# Patient Record
Sex: FEMALE | Race: WHITE | NOT HISPANIC OR LATINO | Employment: STUDENT | URBAN - METROPOLITAN AREA
[De-identification: names, ages, dates, MRNs, and addresses within clinical notes are randomized per-mention and may not be internally consistent; named-entity substitution may affect disease eponyms.]

---

## 2017-05-28 ENCOUNTER — ALLSCRIPTS OFFICE VISIT (OUTPATIENT)
Dept: OTHER | Facility: OTHER | Age: 22
End: 2017-05-28

## 2018-01-07 ENCOUNTER — GENERIC CONVERSION - ENCOUNTER (OUTPATIENT)
Dept: OTHER | Facility: OTHER | Age: 23
End: 2018-01-07

## 2018-01-14 VITALS
RESPIRATION RATE: 24 BRPM | TEMPERATURE: 97.8 F | HEIGHT: 70 IN | DIASTOLIC BLOOD PRESSURE: 64 MMHG | WEIGHT: 270 LBS | SYSTOLIC BLOOD PRESSURE: 132 MMHG | HEART RATE: 63 BPM | BODY MASS INDEX: 38.65 KG/M2 | OXYGEN SATURATION: 97 %

## 2018-01-24 VITALS
BODY MASS INDEX: 37.83 KG/M2 | RESPIRATION RATE: 18 BRPM | SYSTOLIC BLOOD PRESSURE: 120 MMHG | OXYGEN SATURATION: 97 % | TEMPERATURE: 99.2 F | HEART RATE: 83 BPM | HEIGHT: 70 IN | WEIGHT: 264.25 LBS | DIASTOLIC BLOOD PRESSURE: 78 MMHG

## 2018-08-13 ENCOUNTER — OFFICE VISIT (OUTPATIENT)
Dept: URGENT CARE | Facility: CLINIC | Age: 23
End: 2018-08-13
Payer: COMMERCIAL

## 2018-08-13 ENCOUNTER — APPOINTMENT (OUTPATIENT)
Dept: RADIOLOGY | Facility: CLINIC | Age: 23
End: 2018-08-13
Payer: COMMERCIAL

## 2018-08-13 VITALS
DIASTOLIC BLOOD PRESSURE: 70 MMHG | HEART RATE: 83 BPM | TEMPERATURE: 98.1 F | BODY MASS INDEX: 39.43 KG/M2 | RESPIRATION RATE: 16 BRPM | HEIGHT: 69 IN | SYSTOLIC BLOOD PRESSURE: 120 MMHG | OXYGEN SATURATION: 100 % | WEIGHT: 266.2 LBS

## 2018-08-13 DIAGNOSIS — J20.9 ACUTE BRONCHITIS, UNSPECIFIED ORGANISM: Primary | ICD-10-CM

## 2018-08-13 DIAGNOSIS — J06.9 ACUTE URI: ICD-10-CM

## 2018-08-13 DIAGNOSIS — J20.9 ACUTE BRONCHITIS, UNSPECIFIED ORGANISM: ICD-10-CM

## 2018-08-13 PROCEDURE — 71046 X-RAY EXAM CHEST 2 VIEWS: CPT

## 2018-08-13 PROCEDURE — 99214 OFFICE O/P EST MOD 30 MIN: CPT | Performed by: PHYSICIAN ASSISTANT

## 2018-08-13 RX ORDER — GLIMEPIRIDE 4 MG/1
TABLET ORAL
COMMUNITY
End: 2019-11-24 | Stop reason: ALTCHOICE

## 2018-08-13 RX ORDER — AZITHROMYCIN 250 MG/1
TABLET, FILM COATED ORAL
Qty: 6 TABLET | Refills: 0 | Status: SHIPPED | OUTPATIENT
Start: 2018-08-13 | End: 2018-08-17

## 2018-08-13 RX ORDER — ALBUTEROL SULFATE 90 UG/1
AEROSOL, METERED RESPIRATORY (INHALATION)
COMMUNITY
End: 2019-11-24 | Stop reason: ALTCHOICE

## 2018-08-13 NOTE — PROGRESS NOTES
[facility]        NAME: Jayson Galeazzi is a 21 y o  female  : 1995    MRN: [id number]  DATE: 2018  TIME: 1:42 PM    Assessment and Plan   Acute bronchitis, unspecified organism [J20 9]  1  Acute bronchitis, unspecified organism  XR chest pa & lateral   2  Acute URI  azithromycin (ZITHROMAX) 250 mg tablet     Patient Instructions   Take antibiotic as directed with food and water  While on this medication begin a decongestant such as Mucinex and a nasal steroid such as Flonase  Use a cool mist humidifier at bedtime, turning on hours prior to bed with bedroom doors shut for maximum relief  You may take tylenol or motrin as needed for fever and discomfort  Follow up with your family doctor in 3-5 days  Proceed to the ER if symptoms worsen  Advised regular use of albuterol inhaler, 1-2 puffs every 4-6 hours prn chest congestion  Reviewed treatment plan in length  All questions answered  Precautions given  Chief Complaint     Chief Complaint   Patient presents with    Cough     x two weeks barking cough, chest tightness using an inhaler 1 x per day     History of Present Illness       20 y/o female presenting with c/o of dry, barking cough x ~2 5 weeks  Pt notes sensation of chest congestion but states she has to repeatedly forcefully cough and force herself to bring up phlegm  She does not SOB both at rest and with exertion, occurring ~1-2 times per day, occasionally associated with reported inspiratory wheezing, with relief of these symptoms with albuterol inhaler  Sx associated with nasal congestion, maxillary sinus pressure, b/l ear clogging, and PND  No GI sx  No treatments tried  She notes she gets recurrent infections but has not been evaluated by PCP or other physician for immunocompromise  Review of Systems   Review of Systems   Constitutional: Negative for chills, diaphoresis, fatigue and fever  HENT: Negative for sore throat      Respiratory: Negative for chest tightness  Cardiovascular: Negative for chest pain and palpitations  Gastrointestinal: Negative for abdominal pain, diarrhea, nausea and vomiting  Musculoskeletal: Negative for arthralgias, joint swelling and myalgias  Skin: Negative for color change and rash  Neurological: Negative for dizziness, weakness, light-headedness and headaches  Current Medications       Current Outpatient Prescriptions:     albuterol (PROVENTIL HFA,VENTOLIN HFA) 90 mcg/act inhaler, Inhale, Disp: , Rfl:     glimepiride (AMARYL) 4 mg tablet, Take by mouth, Disp: , Rfl:     azithromycin (ZITHROMAX) 250 mg tablet, Take two tablets on day one, then one tablet daily  , Disp: 6 tablet, Rfl: 0    Current Allergies     Allergies as of 08/13/2018    (No Known Allergies)            The following portions of the patient's history were reviewed and updated as appropriate: allergies, current medications, past family history, past medical history, past social history, past surgical history and problem list      History reviewed  No pertinent past medical history  Past Surgical History:   Procedure Laterality Date    FOOT SURGERY      HIP ARTHROSCOPY         Family History   Problem Relation Age of Onset    No Known Problems Mother     Diabetes Father     Cancer Father          Medications have been verified  Objective   /70   Pulse 83   Temp 98 1 °F (36 7 °C)   Resp 16   Ht 5' 9" (1 753 m)   Wt 121 kg (266 lb 3 2 oz)   LMP 07/13/2018   SpO2 100%   BMI 39 31 kg/m²      CXR: no acute cardiopulmonary disease  Physical Exam     Physical Exam   Constitutional: She is oriented to person, place, and time  She appears well-developed and well-nourished  She appears ill  No distress  HENT:   Head: Normocephalic and atraumatic  Right Ear: Tympanic membrane, external ear and ear canal normal    Left Ear: Tympanic membrane, external ear and ear canal normal    Nose: Mucosal edema and rhinorrhea (clear) present  Mouth/Throat: Uvula is midline, oropharynx is clear and moist and mucous membranes are normal  Mucous membranes are not pale and not dry  No oropharyngeal exudate, posterior oropharyngeal edema or posterior oropharyngeal erythema  Eyes: Conjunctivae are normal    Neck: Neck supple  Cardiovascular: Normal rate, regular rhythm and normal heart sounds  Pulmonary/Chest: Effort normal  No respiratory distress  She has no decreased breath sounds  Coarse breath sounds throughout all lung fields  Breath sounds decreased in RUL and RML  No wheezes, rales, or rhonchi  Lymphadenopathy:     She has cervical adenopathy  Neurological: She is alert and oriented to person, place, and time  No cranial nerve deficit  She exhibits normal muscle tone  Coordination normal    Skin: Skin is warm and dry  No rash noted  She is not diaphoretic  Psychiatric: She has a normal mood and affect  Her behavior is normal    Nursing note and vitals reviewed

## 2018-08-13 NOTE — PATIENT INSTRUCTIONS
Take antibiotic as directed with food and water  While on this medication begin a decongestant such as Mucinex and a nasal steroid such as Flonase  Use a cool mist humidifier at bedtime, turning on hours prior to bed with bedroom doors shut for maximum relief  You may take tylenol or motrin as needed for fever and discomfort  Follow up with your family doctor in 3-5 days  Proceed to the ER if symptoms worsen

## 2019-07-09 ENCOUNTER — OFFICE VISIT (OUTPATIENT)
Dept: URGENT CARE | Facility: CLINIC | Age: 24
End: 2019-07-09
Payer: COMMERCIAL

## 2019-07-09 VITALS
OXYGEN SATURATION: 99 % | DIASTOLIC BLOOD PRESSURE: 80 MMHG | SYSTOLIC BLOOD PRESSURE: 118 MMHG | BODY MASS INDEX: 38.54 KG/M2 | HEART RATE: 76 BPM | TEMPERATURE: 98.8 F | WEIGHT: 261 LBS | RESPIRATION RATE: 16 BRPM

## 2019-07-09 DIAGNOSIS — J06.9 ACUTE URI: Primary | ICD-10-CM

## 2019-07-09 PROCEDURE — 99213 OFFICE O/P EST LOW 20 MIN: CPT | Performed by: PHYSICIAN ASSISTANT

## 2019-07-09 RX ORDER — FLUTICASONE PROPIONATE 50 MCG
1 SPRAY, SUSPENSION (ML) NASAL DAILY
Qty: 1 BOTTLE | Refills: 0 | Status: SHIPPED | OUTPATIENT
Start: 2019-07-09 | End: 2019-11-24 | Stop reason: ALTCHOICE

## 2019-07-09 RX ORDER — BENZONATATE 100 MG/1
200 CAPSULE ORAL 3 TIMES DAILY PRN
Qty: 30 CAPSULE | Refills: 0 | Status: SHIPPED | OUTPATIENT
Start: 2019-07-09 | End: 2019-11-24 | Stop reason: ALTCHOICE

## 2019-07-09 NOTE — PATIENT INSTRUCTIONS

## 2019-07-09 NOTE — PROGRESS NOTES
[facility]        NAME: Barbara Aguayo is a 21 y o  female  : 1995    MRN: [id number]  DATE: 2019  TIME: 10:02 AM    Assessment and Plan   Acute URI [J06 9]  1  Acute URI  benzonatate (TESSALON PERLES) 100 mg capsule    fluticasone (FLONASE) 50 mcg/act nasal spray         Patient Instructions     Acute Upper Respiratory Tract Infection:   -There is no sign of bacterial infection on exam at this time  This is likely a viral illness  Advised supportive measures   -Fluticasone Nasal Spray for PND and congestion-PND may be contributing to the cough  Tessalon perles for the cough  -You can use OTC zyrtec or claritin  You can OTC mucinex  -Use a humidifier next to your bed  Take steam showers  -You can take Advil or Tylenol for fever or pain  -Stay very well hydrated and rest   -If your symptoms persist or worsen follow up immediately with your PCP    Follow up with PCP in 3-5 days  Proceed to  ER if symptoms worsen  Chief Complaint     Chief Complaint   Patient presents with    Cold Like Symptoms      started Friday: hoarse voice, pain with swallowing, ear pain bilateral, body aches; fatigued         History of Present Illness       The patient presents today for a four day hx of hoarseness, pharyngitis and bilateral otalgia with myalgias and fatigue  The patient states that she is also experiencing a dry cough that began two days ago  She denies fever, chills, dyspnea, wheezing, cp, palpitations, dizziness, headache, hearing loss, tinnitus, otorrhea, neck pain, drooling  She denies sick contacts or recent travel  She denies a hx of asthma or smoking  No OTC measures have been attempted  Review of Systems   Review of Systems   Constitutional: Positive for fatigue  Negative for activity change, appetite change, chills, diaphoresis and fever  HENT: Positive for ear pain, sore throat, trouble swallowing and voice change   Negative for congestion, ear discharge, facial swelling, hearing loss, postnasal drip, rhinorrhea, sinus pressure, sinus pain, sneezing and tinnitus  Respiratory: Positive for cough  Negative for chest tightness, shortness of breath, wheezing and stridor  Cardiovascular: Negative for chest pain, palpitations and leg swelling  Gastrointestinal: Negative for abdominal pain, diarrhea, nausea and vomiting  Musculoskeletal: Positive for myalgias  Negative for arthralgias, joint swelling, neck pain and neck stiffness  Skin: Negative for rash  Allergic/Immunologic: Negative for immunocompromised state  Neurological: Negative for dizziness, weakness, light-headedness, numbness and headaches  Hematological: Negative for adenopathy  Current Medications       Current Outpatient Medications:     albuterol (PROVENTIL HFA,VENTOLIN HFA) 90 mcg/act inhaler, Inhale, Disp: , Rfl:     benzonatate (TESSALON PERLES) 100 mg capsule, Take 2 capsules (200 mg total) by mouth 3 (three) times a day as needed for cough, Disp: 30 capsule, Rfl: 0    fluticasone (FLONASE) 50 mcg/act nasal spray, 1 spray into each nostril daily, Disp: 1 Bottle, Rfl: 0    glimepiride (AMARYL) 4 mg tablet, Take by mouth, Disp: , Rfl:     Current Allergies     Allergies as of 07/09/2019    (No Known Allergies)            The following portions of the patient's history were reviewed and updated as appropriate: allergies, current medications, past family history, past medical history, past social history, past surgical history and problem list      Past Medical History:   Diagnosis Date    Patient denies medical problems        Past Surgical History:   Procedure Laterality Date    FOOT SURGERY      HIP ARTHROSCOPY      TONSILLECTOMY         Family History   Problem Relation Age of Onset    No Known Problems Mother     Diabetes Father     Cancer Father          Medications have been verified          Objective   /80   Pulse 76   Temp 98 8 °F (37 1 °C)   Resp 16   Wt 118 kg (261 lb)   LMP 06/23/2019   SpO2 99%   BMI 38 54 kg/m²        Physical Exam     Physical Exam   Constitutional: She is oriented to person, place, and time  She appears well-developed and well-nourished  No distress  HENT:   Head: Normocephalic and atraumatic  Right Ear: Hearing, external ear and ear canal normal  A middle ear effusion is present  Left Ear: Hearing, external ear and ear canal normal  A middle ear effusion is present  Nose: Mucosal edema and rhinorrhea present  Right sinus exhibits no maxillary sinus tenderness and no frontal sinus tenderness  Left sinus exhibits no maxillary sinus tenderness and no frontal sinus tenderness  Mouth/Throat: Uvula is midline, oropharynx is clear and moist and mucous membranes are normal  No uvula swelling  No oropharyngeal exudate, posterior oropharyngeal edema, posterior oropharyngeal erythema or tonsillar abscesses  Tonsils are 0 on the right  Tonsils are 0 on the left  Neck: Normal range of motion  Neck supple  Cardiovascular: Normal rate, regular rhythm, S1 normal and S2 normal  Exam reveals no gallop, no S3, no S4, no distant heart sounds and no friction rub  No murmur heard  Pulmonary/Chest: No accessory muscle usage or stridor  No tachypnea and no bradypnea  No respiratory distress  She has no decreased breath sounds  She has no wheezes  She has no rhonchi  She has no rales  Course breath sounds/transmitted upper airway sounds of the upper lung bilaterally    Lymphadenopathy:     She has no cervical adenopathy  Neurological: She is alert and oriented to person, place, and time  Skin: She is not diaphoretic  Psychiatric: She has a normal mood and affect  Her behavior is normal    Nursing note and vitals reviewed

## 2019-11-24 ENCOUNTER — OFFICE VISIT (OUTPATIENT)
Dept: URGENT CARE | Facility: CLINIC | Age: 24
End: 2019-11-24
Payer: COMMERCIAL

## 2019-11-24 VITALS
SYSTOLIC BLOOD PRESSURE: 126 MMHG | OXYGEN SATURATION: 99 % | TEMPERATURE: 98.2 F | HEART RATE: 80 BPM | WEIGHT: 260 LBS | DIASTOLIC BLOOD PRESSURE: 74 MMHG | BODY MASS INDEX: 38.4 KG/M2 | RESPIRATION RATE: 14 BRPM

## 2019-11-24 DIAGNOSIS — J20.9 ACUTE BRONCHITIS, UNSPECIFIED ORGANISM: ICD-10-CM

## 2019-11-24 DIAGNOSIS — J01.00 ACUTE NON-RECURRENT MAXILLARY SINUSITIS: Primary | ICD-10-CM

## 2019-11-24 PROCEDURE — 99213 OFFICE O/P EST LOW 20 MIN: CPT | Performed by: NURSE PRACTITIONER

## 2019-11-24 RX ORDER — AMOXICILLIN AND CLAVULANATE POTASSIUM 875; 125 MG/1; MG/1
1 TABLET, FILM COATED ORAL EVERY 12 HOURS SCHEDULED
Qty: 14 TABLET | Refills: 0 | Status: SHIPPED | OUTPATIENT
Start: 2019-11-24 | End: 2019-12-01

## 2019-11-24 RX ORDER — PREDNISONE 20 MG/1
20 TABLET ORAL DAILY
Qty: 5 TABLET | Refills: 0 | Status: SHIPPED | OUTPATIENT
Start: 2019-11-24 | End: 2019-11-29

## 2019-11-24 RX ORDER — DEXTROMETHORPHAN HYDROBROMIDE AND PROMETHAZINE HYDROCHLORIDE 15; 6.25 MG/5ML; MG/5ML
5 SOLUTION ORAL 4 TIMES DAILY PRN
Qty: 180 ML | Refills: 0 | Status: SHIPPED | OUTPATIENT
Start: 2019-11-24 | End: 2020-02-28 | Stop reason: ALTCHOICE

## 2019-11-24 RX ORDER — BECLOMETHASONE DIPROPIONATE HFA 80 UG/1
AEROSOL, METERED RESPIRATORY (INHALATION)
COMMUNITY
Start: 2019-11-22

## 2019-11-24 NOTE — PROGRESS NOTES
[facility]        NAME: Marnie Montague is a 25 y o  female  : 1995    MRN: [id number]  DATE: 2019  TIME: 9:17 AM    Assessment and Plan   1  Acute non-recurrent maxillary sinusitis  - amoxicillin-clavulanate (AUGMENTIN) 875-125 mg per tablet; Take 1 tablet by mouth every 12 (twelve) hours for 7 days  Dispense: 14 tablet; Refill: 0  2  Acute bronchitis, unspecified organism  - Promethazine-DM (PHENERGAN-DM) 6 25-15 mg/5 mL oral syrup; Take 5 mL by mouth 4 (four) times a day as needed for cough  Dispense: 180 mL; Refill: 0  - predniSONE 20 mg tablet; Take 1 tablet (20 mg total) by mouth daily for 5 days  Dispense: 5 tablet; Refill: 0      Patient Instructions     Fluids  Rest  Nasal saline  Symptom management for supportive care such as decongestants, tylenol/motrin as needed for fever or discomfort  Use a cool mist humidifier at bedtime  Finish antibiotics as prescribed  Augmentin 875mg twice daily for 7 days  Prednisone 20mg daily with food for 5 days  Promethazine DM as needed for cough  Warm compresses to facilitate nasal drainage  Rescue inhaler 2 puffs every 4-6 hours as needed for shortness breath, chest tightness, bronchospasm, coughing fits  Follow up with PCP in 3-5 days        Chief Complaint     Chief Complaint   Patient presents with   Delma Sang Like Symptoms     pt presents with productive cough, body aches, ear pain bilateral, sinus pressure, PND; started 2 weeks ago         History of Present Illness       Here for cold symptoms for 2 weeks  Nasal congestion, headaches, cough, chest tightness  Sore throat  No fevers  Took sudafed and other decongestants and delsym  Has asthma, more seasonal secondary to allergies  Uses Qvar daily  Has not had to use rescue inhaler  Review of Systems   Review of Systems   Constitutional: Positive for fatigue  Negative for fever     HENT: Positive for congestion, postnasal drip, rhinorrhea, sinus pressure, sinus pain and sore throat  Respiratory: Positive for cough, shortness of breath and wheezing  Gastrointestinal: Negative for diarrhea, nausea and vomiting  Musculoskeletal: Positive for myalgias  Skin: Negative for rash  Neurological: Positive for headaches  Negative for dizziness  Hematological: Negative for adenopathy  Current Medications       Current Outpatient Medications:     QVAR REDIHALER 80 MCG/ACT inhaler, , Disp: , Rfl:     Current Allergies     Allergies as of 11/24/2019    (No Known Allergies)            The following portions of the patient's history were reviewed and updated as appropriate: allergies, current medications, past family history, past medical history, past social history, past surgical history and problem list      Past Medical History:   Diagnosis Date    Allergic     seasonal    Patient denies medical problems        Past Surgical History:   Procedure Laterality Date    FOOT SURGERY      HIP ARTHROSCOPY      TONSILLECTOMY         Family History   Problem Relation Age of Onset    No Known Problems Mother     Diabetes Father     Cancer Father          Medications have been verified  Objective   /74   Pulse 80   Temp 98 2 °F (36 8 °C)   Resp 14   Wt 118 kg (260 lb)   LMP 11/03/2019   SpO2 99%   BMI 38 40 kg/m²        Physical Exam     Physical Exam   Constitutional: She appears well-developed and well-nourished  HENT:   TMS WNL  Turbinates significantly inflamed  Oropharynx with no erythema or exudate    (+) maxillary sinus tenderness to palpation    (+) PND     Pulmonary/Chest: Effort normal    Slightly decreased breath sounds  Faint scattered wheeze   Lymphadenopathy:     She has no cervical adenopathy  Neurological: She is alert  Skin: Skin is warm and dry  Vitals reviewed

## 2019-11-24 NOTE — PATIENT INSTRUCTIONS
Fluids  Rest  Nasal saline  Symptom management for supportive care such as decongestants, tylenol/motrin as needed for fever or discomfort  Use a cool mist humidifier at bedtime  Finish antibiotics as prescribed  Augmentin 875mg twice daily for 7 days  Prednisone 20mg daily with food for 5 days  Promethazine DM as needed for cough  Warm compresses to facilitate nasal drainage  Rescue inhaler 2 puffs every 4-6 hours as needed for shortness breath, chest tightness, bronchospasm, coughing fits     Follow up with PCP in 3-5 days

## 2020-02-28 ENCOUNTER — OFFICE VISIT (OUTPATIENT)
Dept: URGENT CARE | Facility: CLINIC | Age: 25
End: 2020-02-28
Payer: COMMERCIAL

## 2020-02-28 VITALS
BODY MASS INDEX: 38.4 KG/M2 | DIASTOLIC BLOOD PRESSURE: 84 MMHG | SYSTOLIC BLOOD PRESSURE: 128 MMHG | TEMPERATURE: 99.1 F | HEART RATE: 86 BPM | RESPIRATION RATE: 16 BRPM | OXYGEN SATURATION: 99 % | WEIGHT: 260 LBS

## 2020-02-28 DIAGNOSIS — J20.8 ACUTE VIRAL BRONCHITIS: Primary | ICD-10-CM

## 2020-02-28 PROBLEM — E11.9 TYPE 2 DIABETES MELLITUS (HCC): Status: ACTIVE | Noted: 2017-05-28

## 2020-02-28 PROBLEM — J45.909 ASTHMA: Status: ACTIVE | Noted: 2017-05-28

## 2020-02-28 PROCEDURE — 99213 OFFICE O/P EST LOW 20 MIN: CPT | Performed by: FAMILY MEDICINE

## 2020-02-28 RX ORDER — PREDNISONE 50 MG/1
50 TABLET ORAL DAILY
Qty: 5 TABLET | Refills: 0 | Status: SHIPPED | OUTPATIENT
Start: 2020-02-28 | End: 2020-03-04

## 2020-02-28 RX ORDER — ALBUTEROL SULFATE 90 UG/1
2 AEROSOL, METERED RESPIRATORY (INHALATION) EVERY 6 HOURS PRN
Qty: 18 G | Refills: 0 | Status: SHIPPED | OUTPATIENT
Start: 2020-02-28

## 2020-02-28 RX ORDER — BENZONATATE 200 MG/1
200 CAPSULE ORAL 3 TIMES DAILY PRN
Qty: 20 CAPSULE | Refills: 0 | Status: SHIPPED | OUTPATIENT
Start: 2020-02-28

## 2020-02-28 NOTE — PATIENT INSTRUCTIONS
1  Acute Viral Bronchitis  - Prednisone x 5 days prescribed, complete as directed-- patient is diabetic, aware to stay compliant w/ treatment plan and monitor blood sugar levels  - Tessalon pearls prescribed to help w/ cough, use as directed  - Albuterol inhaler prescribed to help w/ wheezing, use as directed  - rest and drink plenty of fluids  - take Tylenol or Motrin as needed  - run a humidifier at home   - try warm salt water gargles and throat lozenges as needed  - try warm tea w/ lemon and honey   - follow up w/ PCP for re-check in 3-5 days   - if symptoms persist despite treatment, worsen, or any new symptoms present, should be seen in the ER

## 2020-02-28 NOTE — PROGRESS NOTES
[facility]        NAME: Edy Rodgers is a 25 y o  female  : 1995    MRN: [id number]  DATE: 2020  TIME: 4:58 PM    Assessment and Plan   Acute viral bronchitis [J20 8]  1  Acute viral bronchitis  benzonatate (TESSALON) 200 MG capsule    albuterol (Ventolin HFA) 90 mcg/act inhaler    predniSONE 50 mg tablet         Patient Instructions     Patient Instructions   1  Acute Viral Bronchitis  - Prednisone x 5 days prescribed, complete as directed-- patient is diabetic, aware to stay compliant w/ treatment plan and monitor blood sugar levels  - Tessalon pearls prescribed to help w/ cough, use as directed  - Albuterol inhaler prescribed to help w/ wheezing, use as directed  - rest and drink plenty of fluids  - take Tylenol or Motrin as needed  - run a humidifier at home   - try warm salt water gargles and throat lozenges as needed  - try warm tea w/ lemon and honey   - follow up w/ PCP for re-check in 3-5 days   - if symptoms persist despite treatment, worsen, or any new symptoms present, should be seen in the ER  Follow up with PCP in 3-5 days  Proceed to  ER if symptoms worsen  Chief Complaint     Chief Complaint   Patient presents with   Walker Pierre Like Symptoms     recently returned from trip to [place]         History of Present Illness       26 yo female presents c/o nasal congestion, rhinorrhea, sore throat, chest congestion, and productive cough x 4 days  No fever/chills  No headache or body aches  No chest pain or SOB, but she has noted some wheezing  She has a diagnosis of asthma for which she is on Qvar daily and uses as prescribed  She is not a smoker  No GI sx  No skin rashes  She did not get the flu shot this year  She recently returned from a trip to [place], no international travel  No known sick contacts  She has been taking Mucinex and Phenergan cough syrup as needed  Review of Systems   Review of Systems   Constitutional: Negative      HENT:        As noted in HPI   Eyes: Negative  Respiratory:        As noted in HPI   Cardiovascular: Negative  Gastrointestinal: Negative  Musculoskeletal: Negative  Skin: Negative  Neurological: Negative  Hematological: Negative  Current Medications       Current Outpatient Medications:     QVAR REDIHALER 80 MCG/ACT inhaler, , Disp: , Rfl:     albuterol (Ventolin HFA) 90 mcg/act inhaler, Inhale 2 puffs every 6 (six) hours as needed for wheezing or shortness of breath, Disp: 18 g, Rfl: 0    benzonatate (TESSALON) 200 MG capsule, Take 1 capsule (200 mg total) by mouth 3 (three) times a day as needed for cough, Disp: 20 capsule, Rfl: 0    predniSONE 50 mg tablet, Take 1 tablet (50 mg total) by mouth daily for 5 days, Disp: 5 tablet, Rfl: 0    Current Allergies     Allergies as of 02/28/2020    (No Known Allergies)            The following portions of the patient's history were reviewed and updated as appropriate: allergies, current medications, past family history, past medical history, past social history, past surgical history and problem list      Past Medical History:   Diagnosis Date    Allergic     seasonal    Patient denies medical problems        Past Surgical History:   Procedure Laterality Date    FOOT SURGERY      HIP ARTHROSCOPY      TONSILLECTOMY         Family History   Problem Relation Age of Onset    No Known Problems Mother     Diabetes Father     Cancer Father          Medications have been verified  Objective   /84   Pulse 86   Temp 99 1 °F (37 3 °C)   Resp 16   Wt 118 kg (260 lb)   LMP 01/30/2020   SpO2 99%   BMI 38 40 kg/m²        Physical Exam     Physical Exam   Constitutional: She is oriented to person, place, and time  Vital signs are normal  She appears well-developed and well-nourished  She is active and cooperative  Non-toxic appearance  She does not have a sickly appearance  She does not appear ill  No distress     HENT:   Head: Normocephalic and atraumatic  Right Ear: Tympanic membrane, external ear and ear canal normal    Left Ear: Tympanic membrane, external ear and ear canal normal    Nose: Mucosal edema and rhinorrhea present  Right sinus exhibits no maxillary sinus tenderness and no frontal sinus tenderness  Left sinus exhibits no maxillary sinus tenderness and no frontal sinus tenderness  Mouth/Throat: Uvula is midline and mucous membranes are normal  Posterior oropharyngeal erythema present  No oropharyngeal exudate, posterior oropharyngeal edema or tonsillar abscesses  Eyes: Conjunctivae and EOM are normal    Neck: Normal range of motion  Neck supple  Cardiovascular: Normal rate, regular rhythm and normal heart sounds  Pulmonary/Chest: Effort normal  No accessory muscle usage  No tachypnea  No respiratory distress  Mild expiratory wheezing    Lymphadenopathy:     She has no cervical adenopathy  Neurological: She is alert and oriented to person, place, and time  Skin: Skin is warm and dry  She is not diaphoretic  No pallor  Psychiatric: She has a normal mood and affect  Her speech is normal and behavior is normal  Judgment and thought content normal  Cognition and memory are normal    Nursing note and vitals reviewed